# Patient Record
(demographics unavailable — no encounter records)

---

## 2024-10-21 NOTE — REVIEW OF SYSTEMS
[Hearing Loss] : hearing loss [Ear Pain] : no ear pain [Ear Itch] : no ear itch [Dizziness] : no dizziness [Vertigo] : no vertigo [Recurrent Ear Infections] : no recurrent ear infections [Lightheadedness] : no lightheadedness [Ear Drainage] : no ear drainage [Ear Noises] : no ear noises [de-identified] : imbalance

## 2024-10-21 NOTE — ASSESSMENT
[FreeTextEntry1] : Imbalance - MRI IAC w contrast - normal, results reviewed with patient today - VNG - not obtained yet, pt wants to hold off for now - Sway on romberg - hx cataract surgery - hx of left hip steroid injection - balance handout given to patient - vestibular rehab exercises reviewed with patient and handout given - vestibular rehab referral - telehealth in 3 months - I will manage her imbalance longitudinally  Bilateral SNHL - bilateral mild down to severe SNHL - patient has obtained hearing aids - discussed connection between hearing loss and cognitive decline/dementia - I will manage her hearing loss longitudinally - telehealth in 3 months - f/u in 1 year (September 2025) with audiogram

## 2024-10-21 NOTE — HISTORY OF PRESENT ILLNESS
[de-identified] : 79F who presents with chronic bilateral hearing loss for the past 2 years. She feels that she has to ask people to repeat themselves. She does not have otalgia, otorrhea, tinnitus, acute hearing changes. She also endorses imbalance for the past few months. No tan vertigo. She has cataracts and issues with her left hip. She received a left hip steroid injection 3 years ago.   10/21/24: f/u for imbalance. She underwent MRI which was normal except for volume loss. She did not undergo VNG. She has gotten hearing aids. She recently felt like her knees had issues when she was bending down to pick something up.

## 2024-10-21 NOTE — DATA REVIEWED
[de-identified] : Audiogram personally reviewed and interpreted and my findings are as follows (9/5/24): Right: SRT 35dB; %; Type A tymp; mild down to severe SNHL Left: SRT 35dB; %; Type A tymp; mild down to mod-severe SNHL [de-identified] : MRI IAC w contrast slides visually reviewed and personally interpreted as follows (9/16/24): no retrocochlear pathology, CPA and IACs clear with no lesions or masses, mastoids patent bilaterally  MRI IAC w contrast radiology read: IMPRESSION: No evidence of vestibular schwannoma.  No acute intracranial hemorrhage, acute ischemia, or abnormal intracranial enhancement.  Multiple nonspecific abnormal white matter foci of T2/FLAIR prolongation statistically favoring microvascular type changes.  There is diffuse cerebral volume loss with prominence of the sulci, fissures, and cisternal spaces which is normal for the patient's age.

## 2024-10-21 NOTE — HISTORY OF PRESENT ILLNESS
[de-identified] : 79F who presents with chronic bilateral hearing loss for the past 2 years. She feels that she has to ask people to repeat themselves. She does not have otalgia, otorrhea, tinnitus, acute hearing changes. She also endorses imbalance for the past few months. No tan vertigo. She has cataracts and issues with her left hip. She received a left hip steroid injection 3 years ago.   10/21/24: f/u for imbalance. She underwent MRI which was normal except for volume loss. She did not undergo VNG. She has gotten hearing aids. She recently felt like her knees had issues when she was bending down to pick something up.

## 2024-10-21 NOTE — REVIEW OF SYSTEMS
[Hearing Loss] : hearing loss [Ear Pain] : no ear pain [Ear Itch] : no ear itch [Dizziness] : no dizziness [Vertigo] : no vertigo [Recurrent Ear Infections] : no recurrent ear infections [Lightheadedness] : no lightheadedness [Ear Drainage] : no ear drainage [Ear Noises] : no ear noises [de-identified] : imbalance

## 2024-10-21 NOTE — DATA REVIEWED
[de-identified] : Audiogram personally reviewed and interpreted and my findings are as follows (9/5/24): Right: SRT 35dB; %; Type A tymp; mild down to severe SNHL Left: SRT 35dB; %; Type A tymp; mild down to mod-severe SNHL [de-identified] : MRI IAC w contrast slides visually reviewed and personally interpreted as follows (9/16/24): no retrocochlear pathology, CPA and IACs clear with no lesions or masses, mastoids patent bilaterally  MRI IAC w contrast radiology read: IMPRESSION: No evidence of vestibular schwannoma.  No acute intracranial hemorrhage, acute ischemia, or abnormal intracranial enhancement.  Multiple nonspecific abnormal white matter foci of T2/FLAIR prolongation statistically favoring microvascular type changes.  There is diffuse cerebral volume loss with prominence of the sulci, fissures, and cisternal spaces which is normal for the patient's age.

## 2024-10-21 NOTE — PHYSICAL EXAM
[TextEntry] : General: AAO, no significant distress Psychiatric: affect pleasant and within normal limits Eyes: relatively symmetric, no obvious nystagmus Skin: no significant lesions on face Nose: no significant lesions; patent. Oral Cavity & Oropharynx: no significant deformity or lesions Neck/Lymphatics: no significant masses or abnormal cervical nodes palpated Respiratory: breathing comfortably; no significant distress Neurologic: cranial nerves II-XII grossly intact; EOMI Facial function: symmetric   Ear examination performed under binocular otologic microscope: Left Ear External: Pinna and periauricular area is normal. Canal: Ear canal skin is not inflamed or edematous. Tympanic Membrane: Intact and in good position.   Right Ear External: Pinna and periauricular area is normal. Canal: Ear canal skin is not inflamed or edematous. Tympanic Membrane: Intact and in good position.   Fukuda without turn Romberg without sway No gaze or spontaneous nystagmus Head thrust test negative Head shake nystagmus negative Esme-hallpike negative

## 2024-11-25 NOTE — HISTORY OF PRESENT ILLNESS
[de-identified] : 78 yo female with hx PBS, HTN, sjogrens, here for f/u  Reports she is following with her cardiologist and hepatologist.  Has appt with both and for liver bloodwork next month. She is c/o ingrown toenail and discoloration, would like to see podiatry. Also report she has intermittent weakness of b/l legs, particularly when walking.  Feels they will "give out" under her but they do not.  Thinks she needs to strengthen.  No new back pain or or other associated symptoms.

## 2024-11-25 NOTE — HEALTH RISK ASSESSMENT
[0] : 2) Feeling down, depressed, or hopeless: Not at all (0) [PHQ-2 Negative - No further assessment needed] : PHQ-2 Negative - No further assessment needed [Never] : Never [[id number]] : 0

## 2025-04-21 NOTE — ASSESSMENT
[FreeTextEntry1] : Imbalance - MRI IAC w contrast - normal, results reviewed with patient today - VNG - not obtained yet, pt wants to hold off for now - Sway on romberg - hx cataract surgery - hx of left hip steroid injection - balance handout given to patient - vestibular rehab exercises reviewed with patient and handout given - vestibular rehab referral - continue to monitor  Bilateral SNHL - bilateral mild down to severe SNHL - discussed connection between hearing loss and cognitive decline/dementia - continue HA use - f/u in September 2025 with audiogram  Total time spent on medical discussion: 5-10 minutes

## 2025-04-21 NOTE — HISTORY OF PRESENT ILLNESS
[de-identified] : 79F who presents with chronic bilateral hearing loss for the past 2 years. She feels that she has to ask people to repeat themselves. She does not have otalgia, otorrhea, tinnitus, acute hearing changes. She also endorses imbalance for the past few months. No tan vertigo. She has cataracts and issues with her left hip. She received a left hip steroid injection 3 years ago.   10/21/24: f/u for imbalance. She underwent MRI which was normal except for volume loss. She did not undergo VNG. She has gotten hearing aids. She recently felt like her knees had issues when she was bending down to pick something up.   The provider, DEBBI ALMODOVAR, was located at the medical office located at [address] [place] [address] at the time of the visit. The patient, Aishwarya Corcoran, was located in their home. The patient and Provider participated in the telephonic visit (audio only) which the patient consented to. 4/21/25 (telehealth) - chief complaint - imbalance. Imbalance symptoms have been improving. She has been doing vestibular rehab exercises at home on her own. She did not go to a therapist. Her balance has also been improved since she obtained her hearing aids.

## 2025-04-21 NOTE — PHYSICAL EXAM
[TextEntry] : Deferred due to telehealth Previous exam: General: AAO, no significant distress Psychiatric: affect pleasant and within normal limits Eyes: relatively symmetric, no obvious nystagmus Skin: no significant lesions on face Nose: no significant lesions; patent. Oral Cavity & Oropharynx: no significant deformity or lesions Neck/Lymphatics: no significant masses or abnormal cervical nodes palpated Respiratory: breathing comfortably; no significant distress Neurologic: cranial nerves II-XII grossly intact; EOMI Facial function: symmetric   Ear examination performed under binocular otologic microscope: Left Ear External: Pinna and periauricular area is normal. Canal: Ear canal skin is not inflamed or edematous. Tympanic Membrane: Intact and in good position.   Right Ear External: Pinna and periauricular area is normal. Canal: Ear canal skin is not inflamed or edematous. Tympanic Membrane: Intact and in good position.   Fukuda without turn Romberg without sway No gaze or spontaneous nystagmus Head thrust test negative Head shake nystagmus negative Esme-hallpike negative

## 2025-04-21 NOTE — REVIEW OF SYSTEMS
[Hearing Loss] : hearing loss [Ear Pain] : no ear pain [Ear Itch] : no ear itch [Dizziness] : no dizziness [Vertigo] : no vertigo [Recurrent Ear Infections] : no recurrent ear infections [Lightheadedness] : no lightheadedness [Ear Drainage] : no ear drainage [Ear Noises] : no ear noises [de-identified] : imbalance

## 2025-04-21 NOTE — DATA REVIEWED
[de-identified] : Audiogram personally reviewed and interpreted and my findings are as follows (9/5/24): Right: SRT 35dB; %; Type A tymp; mild down to severe SNHL Left: SRT 35dB; %; Type A tymp; mild down to mod-severe SNHL [de-identified] : MRI IAC w contrast slides visually reviewed and personally interpreted as follows (9/16/24): no retrocochlear pathology, CPA and IACs clear with no lesions or masses, mastoids patent bilaterally  MRI IAC w contrast radiology read: IMPRESSION: No evidence of vestibular schwannoma.  No acute intracranial hemorrhage, acute ischemia, or abnormal intracranial enhancement.  Multiple nonspecific abnormal white matter foci of T2/FLAIR prolongation statistically favoring microvascular type changes.  There is diffuse cerebral volume loss with prominence of the sulci, fissures, and cisternal spaces which is normal for the patient's age.

## 2025-06-09 NOTE — PHYSICAL EXAM
[No Acute Distress] : no acute distress [Well-Appearing] : well-appearing [Normal Sclera/Conjunctiva] : normal sclera/conjunctiva [Normal Outer Ear/Nose] : the outer ears and nose were normal in appearance [No JVD] : no jugular venous distention [No Respiratory Distress] : no respiratory distress  [Supple] : supple [No Accessory Muscle Use] : no accessory muscle use [Clear to Auscultation] : lungs were clear to auscultation bilaterally [Normal Rate] : normal rate  [Regular Rhythm] : with a regular rhythm [No Edema] : there was no peripheral edema [Normal S1, S2] : normal S1 and S2 [Soft] : abdomen soft [Non Tender] : non-tender [Non-distended] : non-distended [Normal Bowel Sounds] : normal bowel sounds [Normal Anterior Cervical Nodes] : no anterior cervical lymphadenopathy [Grossly Normal Strength/Tone] : grossly normal strength/tone [No Rash] : no rash [No Focal Deficits] : no focal deficits [Normal Gait] : normal gait [Normal Affect] : the affect was normal [Normal Insight/Judgement] : insight and judgment were intact [de-identified] : + flesh colored slightly raised mole nose.  multiple flat papules RLE [de-identified] : + TTP 10-12 ribs

## 2025-06-09 NOTE — ASSESSMENT
[FreeTextEntry1] : 81 yo female with hx PBS, HTN, sjogrens, here for CPE.  1) epistaxis - sp cauterization with ENT. will check cbc today.  possibly 2/2 in setting allergies. 2) rib pain - will get xray ribs.  lidocaine patch for pain. 3) skin changes - of nose and legs, refer to derm.  4) CPE counseled continue eating healthy balanced diet - vegetables, lean protein counseled continue walking for exercise pneumonia vaccine UTD recommended bone density referred for mammo cbc, cmp, lipids, tsh, a1c drawn today

## 2025-06-09 NOTE — HEALTH RISK ASSESSMENT
[No] : No [No falls in past year] : Patient reported no falls in the past year [0] : 2) Feeling down, depressed, or hopeless: Not at all (0) [PHQ-2 Negative - No further assessment needed] : PHQ-2 Negative - No further assessment needed [Never] : Never [With Family] : lives with family [] :  [Fully functional (bathing, dressing, toileting, transferring, walking, feeding)] : Fully functional (bathing, dressing, toileting, transferring, walking, feeding) [Fully functional (using the telephone, shopping, preparing meals, housekeeping, doing laundry, using] : Fully functional and needs no help or supervision to perform IADLs (using the telephone, shopping, preparing meals, housekeeping, doing laundry, using transportation, managing medications and managing finances) [de-identified] : see HPI [de-identified] : see HPI [[id number]] : 0 [Change in mental status noted] : No change in mental status noted [Language] : denies difficulty with language [Reports changes in hearing] : Reports no changes in hearing [Reports changes in vision] : Reports no changes in vision

## 2025-06-09 NOTE — HISTORY OF PRESENT ILLNESS
[de-identified] : 79 yo female with hx PBS, HTN, sjogrens, here for CPE.  Reports she had epistaxis twice weekly for two months.  She saw ENT and had cauterization. No further bleeding. She would like to check her platelets. Also c/o R rib pain for 1 week.  No fall or injury.  Reports very tender to palpation. Also c/o skin change of nose and macules of R leg that dry and flake off.    CPE eating healthy balanced diet - vegetables, lean protein walking for exercise pneumonia vaccine UTD recommended bone density referred for mammo

## 2025-06-09 NOTE — REVIEW OF SYSTEMS
[Negative] : Neurological [FreeTextEntry4] : see HPI [FreeTextEntry9] : R rib pain [de-identified] : see HPI 16:48